# Patient Record
Sex: FEMALE | URBAN - METROPOLITAN AREA
[De-identification: names, ages, dates, MRNs, and addresses within clinical notes are randomized per-mention and may not be internally consistent; named-entity substitution may affect disease eponyms.]

---

## 2024-03-11 ENCOUNTER — TRANSFERRED RECORDS (OUTPATIENT)
Dept: HEALTH INFORMATION MANAGEMENT | Facility: CLINIC | Age: 44
End: 2024-03-11

## 2024-03-12 ENCOUNTER — TRANSFERRED RECORDS (OUTPATIENT)
Dept: HEALTH INFORMATION MANAGEMENT | Facility: CLINIC | Age: 44
End: 2024-03-12

## 2024-03-20 ENCOUNTER — TRANSFERRED RECORDS (OUTPATIENT)
Dept: HEALTH INFORMATION MANAGEMENT | Facility: CLINIC | Age: 44
End: 2024-03-20

## 2024-03-26 ENCOUNTER — MEDICAL CORRESPONDENCE (OUTPATIENT)
Dept: HEALTH INFORMATION MANAGEMENT | Facility: CLINIC | Age: 44
End: 2024-03-26

## 2024-03-27 ENCOUNTER — TELEPHONE (OUTPATIENT)
Dept: MATERNAL FETAL MEDICINE | Facility: CLINIC | Age: 44
End: 2024-03-27

## 2024-03-27 NOTE — TELEPHONE ENCOUNTER
Received referral for possible pregnancy termination at 25w0d. Patient was seen by [place]  [facility] for her level II anatomy scan for AMA and FHx cleft lip. An echogenic intracardiac focus was noted on their US. The patient's primary leatha NIPT which was positive for trisomy 21. The patient returned to [facility] for amniocentesis on 3/20/2024, array results are still pending.    [facility]  policy requires a lethal diagnosis for pregnancy termination at this gestational age. Records were reviewed with our [facility] physician and this patient does not meet criteria for lethality.    I spoke with Sharita Zarate GC at [facility] and reviewed the above information. [facility] has already connected the patient with [facility] for  care at this gestational age.    I called the patient and explained that she does  not meet criteria for lethality at [facility]. We would have the same recommendation for pregnancy termination at [facility]. Patient verbalized understanding and will connect with [facility].    Patient was provided my contact information and encouraged to call if we can be of further service. No appointment needs to be scheduled with [facility] at this time.    Yasmeen Valle MS, [facility]  Maternal Fetal Medicine  [facility]  Phone: [phone]